# Patient Record
(demographics unavailable — no encounter records)

---

## 2025-06-03 NOTE — HISTORY OF PRESENT ILLNESS
[FreeTextEntry1] : Patient is a 52 yr-old female, with no significant pmhx, presenting today for evaluation of elevated BPs. Patient reports nasal congestion and headaches, which prompted her to visit an urgent care over the weekend. Her BP was noted to be 177/96 mmHG at that time. BP is 160/90 mmHG in office today. She does not monitor her BP at home. Otherwise denies any other complaints. No recent labs to review. EKG performed today revealed NSR with QTc of 486 ms. She is currently being treated for sinus infection with Z-pack and "nasal spray."   Patient lives a very active lifestyle; She exercises regularly and eats a healthy diet. Denies illicit drug use. Former smoker. +Occasional ETOH on the weekends. Reports her job can be stressful at times. Has three grown children.   Family hx: Father- AAA, DM II

## 2025-06-03 NOTE — DISCUSSION/SUMMARY
[FreeTextEntry1] : Elevated BPs without Dx of HTN: Advised patient to monitor BP at home throughout the day today and follow up with our office via telephone in the AM. Will assess need for antihypertensive medication at that time. Low salt diet and limited alcohol/caffeine intake recommended. Stress reduction techniques discussed. Patient advised to avoid strenuous activity until BPs are controlled.   Family hx of AAA: Due to recently elevated BPs and family hx, will obtain TTE to assess cardiac function and structure.  Prolonged QTc: Likely secondary to Z-pack. Will repeat EKG at next visit in two weeks.   Lab requisition provided for CBC, CMP, TSH w/reflex T4, lipid panel, and HgbA1c.  Patient aware of plan.  [EKG obtained to assist in diagnosis and management of assessed problem(s)] : EKG obtained to assist in diagnosis and management of assessed problem(s)

## 2025-06-19 NOTE — DISCUSSION/SUMMARY
[FreeTextEntry1] : 1. HTN: - Pt has stage 2 HTN with elevated BPs at home. - Therefore, will increase Amlodipine to 10mg PO QD for better BP control.   - Recommend keeping BP log at home to assess if any change in antihypertensive regimen is warranted.  - Other planned treatments include an exercise regimen, weight loss, low sodium diet, and alcohol moderation.  2. HLD: - Last lipid profile demonstrated mildly elevated LDL above 100.  - Will obtain CAC score to evaluate cardiovascular risk and need for statin.  - Other planned treatment includes diet modification, exercise, and weight loss.  3. Valvular Heart Disease: - Echocardiogram performed today demonstrated normal LV function with mild MR and mild AS.  - Will follow mild valvular heart disease with serial echocardiogram as clinically indicated.   4. Abnormal EKG: - Repeat EKG today demonstrated a normal QTc under 460 ms.  - Recommend continuing to hold any QTc prolonging medications.  Instructed to follow up in 2 weeks for BP check, or sooner for new or worsening symptoms.  Plan was discussed with the patient.

## 2025-06-19 NOTE — ASSESSMENT
[FreeTextEntry1] : SEGUNDO BAE is a pleasant 52-year-old female, with a PMHx significant for HTN, who presents today for cardiovascular follow-up and echocardiogram.

## 2025-06-19 NOTE — HISTORY OF PRESENT ILLNESS
[FreeTextEntry1] : SEGUNDO BAE is a pleasant 52-year-old female, with a PMHx significant for HTN, who presents today for cardiovascular follow-up and echocardiogram. Denies chest pain, SOB, or anginal symptoms. Patient reports compliance with her medications but reports elevated BPs at home. Otherwise: (-) palpitations, (-) dizziness, (-) syncope.  Family History: (+) AAA: father, (+) DM II: father, (-) SCD. Social History: (+) Former smoker, (+) Occasional EtOH on weekends, (-) Illicit drug use.

## 2025-06-19 NOTE — CARDIOLOGY SUMMARY
[de-identified] : 06/17/2025: NSR, (-) significant ST-T changes. 06/03/2025: NSR, no ST-T wave changes; QTc 486 ms. =======================================================

## 2025-06-19 NOTE — CARDIOLOGY SUMMARY
[de-identified] : 06/17/2025: NSR, (-) significant ST-T changes. 06/03/2025: NSR, no ST-T wave changes; QTc 486 ms. =======================================================

## 2025-06-30 NOTE — HISTORY OF PRESENT ILLNESS
[FreeTextEntry1] : SEGUNDO BAE is a pleasant 52-year-old female, with a PMHx significant for HTN, presenting today for follow up. Recently started on [id number] QD. BP is currently 130/72 mmHG in office today. TTE performed earlier this month demonstrated normal LV function with mild MR and mild AS. CACS is 0.   Family History: (+) AAA: father, (+) DM II: father, (-) SCD. Social History: (+) Former smoker, (+) Occasional EtOH on weekends, (-) Illicit drug use.

## 2025-06-30 NOTE — CARDIOLOGY SUMMARY
[de-identified] : 06/17/2025: NSR, (-) significant ST-T changes. 06/03/2025: NSR, no ST-T wave changes; QTc 486 ms. =======================================================

## 2025-06-30 NOTE — DISCUSSION/SUMMARY
[FreeTextEntry1] : HTN: BP is controlled. Continue Amlodipine 10mg QD. Other planned treatments include an exercise regimen, weight loss, low sodium diet, and alcohol moderation.  HLD: LDL is mildly elevated at 113. CACS is 0. Continue lifestyle modifications with diet, exercise, and weight loss.  Valvular Heart Disease: Will follow mild valvular heart disease with serial echocardiogram as clinically indicated.   Follow up in 6 months. Plan was discussed with the patient.